# Patient Record
Sex: FEMALE | Race: WHITE
[De-identification: names, ages, dates, MRNs, and addresses within clinical notes are randomized per-mention and may not be internally consistent; named-entity substitution may affect disease eponyms.]

---

## 2018-03-07 ENCOUNTER — HOSPITAL ENCOUNTER (EMERGENCY)
Dept: HOSPITAL 45 - C.EDB | Age: 63
Discharge: HOME | End: 2018-03-07
Payer: COMMERCIAL

## 2018-03-07 VITALS
BODY MASS INDEX: 23.91 KG/M2 | HEIGHT: 62.99 IN | WEIGHT: 134.92 LBS | BODY MASS INDEX: 23.91 KG/M2 | HEIGHT: 62.99 IN | WEIGHT: 134.92 LBS

## 2018-03-07 VITALS — HEART RATE: 76 BPM | SYSTOLIC BLOOD PRESSURE: 122 MMHG | OXYGEN SATURATION: 99 % | DIASTOLIC BLOOD PRESSURE: 76 MMHG

## 2018-03-07 VITALS — TEMPERATURE: 97.7 F

## 2018-03-07 DIAGNOSIS — K59.00: Primary | ICD-10-CM

## 2018-03-07 DIAGNOSIS — I10: ICD-10-CM

## 2018-03-07 DIAGNOSIS — Z87.39: ICD-10-CM

## 2018-03-07 DIAGNOSIS — F32.9: ICD-10-CM

## 2018-03-07 NOTE — EMERGENCY ROOM VISIT NOTE
History


Report prepared by Ulic[employer]:  Che Ruano


Under the Supervision of:  Dr. Raj So D.O.


First contact with patient:  08:14


Chief Complaint:  CONSTIPATION


Stated Complaint:  BOWEL IMPACTION





History of Present Illness


The patient is a 62 year old female who presents to the Emergency Room with 

complaints of constant constipation for the past month. The patient states that 

she has had a bowel blockage for the past month and has only been moving her 

bowels about once a week. Yesterday she felt like she needed to have a bowel 

movement but waited until she got home from work to try to have one. At that 

time she was unable to pass any stool. She tried using a soap suds enema 

without any relief. The patient states that now she has a blockage near her 

rectum. She reports pressure and pain in her rectum. She rates her pain as a 10/

10 in severity. She also states that she has been unable to urinate this 

morning.





   Source of History:  patient


   Onset:  PTA


   Position:  abdomen


   Symptom Intensity:  10/10


   Quality:  other (constipation)


   Timing:  constant


   Associated Symptoms:  + urinary symptoms (unable to void)


Note:


Pt reports rectal pain and pressure.





Review of Systems


See HPI for pertinent positives & negatives. A total of 10 systems reviewed and 

were otherwise negative.





Past Medical & Surgical


Medical Problems:


(1) Essential (Primary) Hypertension


(2) Major Depressive Disorder, Single Episode, Unspecified


(3) Radiculopathy, Lumbar Region


(4) Radiculopathy, Lumbar Region








Family History


No pertinent history stated.





Social History


Smoking Status:  Never Smoker


Marital Status:  in relationship


Occupation Status:  employed





Current/Historical Medications


Scheduled


Atomoxetine (Strattera), 60 MG PO DAILY


Bupropion (Wellbutrin Sr), 150 MG PO DAILY


Cholecalciferol (Vitamin D3), 5,000 UNITS PO DAILY


Clonidine Hcl (Catapres), 1 TAB PO HS


Escitalopram Oxalate (Lexapro), 20 MG PO DAILY


Fenofibrate (Tricor ), 145 MG PO DAILY


Fish Oil ([place]-3), 1 CAP PO DAILY


Levothyroxine Sodium (Levothyroxine Sodium), 1 TAB PO DAILY


Melatonin (Melatonin Maximum Strengt), 1 TAB PO HS


Methylphenidate (Ritalin), 10 MG PO DAILY


Methylphenidate (Ritalin), 5 MG PO noon


Omeprazole (Prilosec), 40 MG PO DAILY


Simvastatin (Zocor), 40 MG PO QPM


Triamterene/Hctz (Dyazide 37.5MG/25MG), 1 CAP PO DAILY





Allergies


Coded Allergies:  


     No Known Allergies (Unverified , 10/31/17)





Physical Exam


Vital Signs











  Date Time  Temp Pulse Resp B/P (MAP) Pulse Ox O2 Delivery O2 Flow Rate FiO2


 


3/7/18 08:02 36.5 103 18 125/84 99 Room Air  











Physical Exam


CONSTITUTIONAL/VITAL SIGNS: Reviewed / noted above.


GENERAL: Non-toxic in appearance. 


INTEGUMENTARY: Warm, dry, and Pink.


HEAD: Normocephalic.


EYES: without scleral icterus or trauma.


ENT/OROPHARYNX: clear and moist.


LYMPHADENOPATHY/NECK: Is supple without lymphadenopathy or meningismus.


RESPIRATORY: Lungs clear and equal.


CARDIOVASCULAR: Regular rate and rhythm.


GI/ABDOMEN: Soft and nontender. No organomegaly or pulsatile mass. No rebound 

or guarding. Normal bowel sounds.


RECTAL: Soft stool in rectal vault, unable to disimpact manually. 


EXTREMITIES: Warm and well perfused.


BACK: No CVA tenderness.


NEUROLOGICAL: Intact without focal deficits. 


PSYCHIATRIC: normal affect.


MUSCULOSKELETAL: Normally developed with good muscle tone.





Medical Decision & Procedures


Medications Administered











 Medications


  (Trade)  Dose


 Ordered  Sig/Vania


 Route  Start Time


 Stop Time Status Last Admin


Dose Admin


 


 Miscellaneous


 Medication


  (Milk And


 Molasses Enema)  1 ea  ONE  ONCE


 FL  3/7/18 08:45


 3/7/18 08:46 DC 3/7/18 09:05


1 EA











ED Course


0814: Previous medical records were reviewed. The patient was evaluated in room 

B6. A complete history and physical examination was performed.





0845: Milk and Molasses enema FL





0941: I reassessed the patient at this time. She is feeling better and resting 

comfortably. She had a bowel movement after the enema. I discussed the results 

and treatment plan with the patient. I answered all pertaining questions that 

she had. She expressed understanding and verbalized agreement. The patient will 

be discharged home.





Medical Decision


Differential considered: pancreatitis, hepatitis, or acute cholecystitis, AAA,  

UTI, pyelonephritis, kidney stones, appendicitis, diverticulitis, shingles, 

bowel obstruction mesenteric ischemia, intussusception, hernia, ovarian torsion

, ruptured ovarian cyst. 





This is a 62-year-old female who presents to the ED with a chief complaint of 

constipation.  The patient states that she has not had a bowel movement for 

about a week.  She feels like the stool is near her rectum but she is unable to 

push it out.  She attempted a enema last night but it did not help.  Exam as 

noted above.  Milk of molasses enema was provided today.  The patient had a 

good bowel movement and is feeling better.  She was felt to be stable for 

discharge.





Medication Reconcilliation


Current Medication List:  was personally reviewed by me





Blood Pressure Screening


Patient's blood pressure:  Normal blood pressure





Impression





 Primary Impression:  


 Constipation





Scribe Attestation


The scribe's documentation has been prepared under my direction and personally 

reviewed by me in its entirety. I confirm that the note above accurately 

reflects all work, treatment, procedures, and medical decision making performed 

by me.





Departure Information


Dispostion


Home / Self-Care





Referrals


Teresita Ramos M.D. (PCP)





Patient Instructions


My [facility]